# Patient Record
Sex: FEMALE | Race: WHITE | NOT HISPANIC OR LATINO | Employment: FULL TIME | URBAN - NONMETROPOLITAN AREA
[De-identification: names, ages, dates, MRNs, and addresses within clinical notes are randomized per-mention and may not be internally consistent; named-entity substitution may affect disease eponyms.]

---

## 2017-01-10 ENCOUNTER — OFFICE VISIT (OUTPATIENT)
Dept: ORTHOPEDIC SURGERY | Facility: CLINIC | Age: 42
End: 2017-01-10

## 2017-01-10 DIAGNOSIS — S59.911D RIGHT FOREARM INJURY, SUBSEQUENT ENCOUNTER: Primary | ICD-10-CM

## 2017-01-10 PROCEDURE — 99203 OFFICE O/P NEW LOW 30 MIN: CPT | Performed by: ORTHOPAEDIC SURGERY

## 2017-01-10 PROCEDURE — 73090 X-RAY EXAM OF FOREARM: CPT | Performed by: ORTHOPAEDIC SURGERY

## 2017-01-10 RX ORDER — LISINOPRIL 20 MG/1
20 TABLET ORAL DAILY
COMMUNITY

## 2017-01-10 RX ORDER — GABAPENTIN 300 MG/1
300 CAPSULE ORAL 3 TIMES DAILY
COMMUNITY

## 2017-01-10 RX ORDER — QUETIAPINE FUMARATE 50 MG/1
50 TABLET, FILM COATED ORAL NIGHTLY
COMMUNITY

## 2017-01-10 NOTE — LETTER
"2017     BIBI Lanier  [address] [facility] [address]    Patient: Rosalina Hannah   YOB: 1975   Date of Visit: 1/10/2017       Dear BIBI Guerra:    Thank you for referring Rosalina Hannah to me for evaluation. Below are the relevant portions of my assessment and plan of care.    If you have questions, please do not hesitate to call me. I look forward to following Rosalina along with you.         Sincerely,        Joe Massey MD        CC: No Recipients  Joe Massey MD  2017  3:23 PM  Signed  Chief Complaint   Patient presents with   • Right Arm - Establish Care             HPI The patient is here today as a new patient complaining of right arm pain, \"it hurts in her wrist, fingers all the way to the elbow.\" Dictation on: 2017  3:20 PM by: JOE MASSEY [[id number]]               No Known Allergies      Social History     Social History   • Marital status:      Spouse name: N/A   • Number of children: N/A   • Years of education: N/A     Occupational History   • Not on file.     Social History Main Topics   • Smoking status: Not on file   • Smokeless tobacco: Not on file   • Alcohol use Not on file   • Drug use: Not on file   • Sexual activity: Not on file     Other Topics Concern   • Not on file     Social History Narrative   • No narrative on file       History reviewed. No pertinent family history.    Past Surgical History   Procedure Laterality Date   • Cholecystectomy     • Hysterectomy     •  section       x2       History reviewed. No pertinent past medical history.        There were no vitals filed for this visit.          Review of Systems   Constitutional: Negative for activity change, appetite change, fatigue, fever and unexpected weight change.   HENT: Negative for congestion, sneezing and voice change.    Eyes: Negative for visual disturbance.   Respiratory: Negative for cough, chest tightness and wheezing.    "   Cardiovascular: Negative for chest pain, palpitations and leg swelling.   Gastrointestinal: Negative for abdominal distention, constipation, diarrhea and vomiting.   Endocrine: Negative for polydipsia, polyphagia and polyuria.   Genitourinary: Negative for decreased urine volume, difficulty urinating, dysuria, flank pain and frequency.   Musculoskeletal: Positive for joint swelling and myalgias. Negative for arthralgias, back pain, gait problem, neck pain and neck stiffness.   Skin: Negative for color change, pallor and wound.   Allergic/Immunologic: Negative for environmental allergies and food allergies.   Neurological: Positive for weakness and numbness. Negative for dizziness and headaches.   Hematological: Does not bruise/bleed easily.   Psychiatric/Behavioral: Negative for agitation, confusion, decreased concentration and sleep disturbance. The patient is not nervous/anxious.            Physical Exam   Constitutional: She is oriented to person, place, and time. She appears well-developed and well-nourished.   HENT:   Head: Normocephalic.   Eyes: Conjunctivae are normal. Pupils are equal, round, and reactive to light.   Neck: Normal range of motion. Neck supple. No JVD present.   Cardiovascular: Normal rate, normal heart sounds and intact distal pulses.    Pulmonary/Chest: Effort normal and breath sounds normal. No respiratory distress.   Abdominal: Soft. Bowel sounds are normal. There is no tenderness. There is no guarding.   Musculoskeletal: She exhibits tenderness.   Lymphadenopathy:     She has no cervical adenopathy.   Neurological: She is alert and oriented to person, place, and time. She has normal reflexes.   Skin: Skin is warm and dry.   Psychiatric: Her behavior is normal. Judgment and thought content normal.   Vitals reviewed.              Joint/Body Part Specific Exam:   Dictation on: 01/14/2017  3:22 PM by: ALEXANDER MASSEY [[id number]]               X-RAY Report:  right Forearm X-Ray  Indication:  evaluation of fx healing  AP and Lateral views  Findings: fully healed and consolidated ulna fx   no bony lesion  Soft tissues within normal limits  within normal limits joint spaces  Hardware appropriately positioned yes      yes prior studies available for comparison.    X-RAY was ordered and reviewed by Joe Massey MD                    Diagnostics:            Rosalina was seen today for establish care.    Diagnoses and all orders for this visit:    Right forearm injury, subsequent encounter  -     XR Forearm 2 View Right          Procedures          I provided this patient with educational materials regarding bone health and cast or splint care.        Plan:    Dictation on: 01/14/2017  3:23 PM by: JOE MASSEY [[id number]]             CC To BIBI Lnaier
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
## 2017-01-10 NOTE — MR AVS SNAPSHOT
Rosalina Hannah   1/10/2017 2:30 PM   Office Visit    Dept Phone:  [phone]   Encounter #:  [id number]    Provider:  Joe Arias MD   Department:  [facility]                Your Full Care Plan              Your Updated Medication List          This list is accurate as of: 1/10/17  2:58 PM.  Always use your most recent med list.                gabapentin 300 MG capsule   Commonly known as:  NEURONTIN       lisinopril 20 MG tablet   Commonly known as:  PRINIVIL,ZESTRIL       QUEtiapine 50 MG tablet   Commonly known as:  SEROquel               We Performed the Following     XR Forearm 2 View Right       You Were Diagnosed With        Codes Comments    Right forearm injury, subsequent encounter    -  Primary ICD-10-CM: S59.911D  ICD-9-CM: V58.89, 959.3       Instructions     None    Patient Instructions History      Upcoming Appointments     Visit Type Date Time Department    OFFICE VISIT 1/10/2017  2:30 PM [facility]    FOLLOW UP 2017  1:45 PM [facility]      Inspire Signup     [facility] Inspire allows you to send messages to your doctor, view your test results, renew your prescriptions, schedule appointments, and more. To sign up, go to [email or web address] and click on the Sign Up Now link in the New User? box. Enter your Inspire Activation Code exactly as it appears below along with the last four digits of your Social Security Number and your Date of Birth () to complete the sign-up process. If you do not sign up before the expiration date, you must request a new code.    Inspire Activation Code: [email or web address]  Expires: 2017  2:58 PM    If you have questions, you can email [email or web address] or call [phone] to talk to our Inspire staff. Remember, Inspire is NOT to be used for urgent needs. For medical emergencies, dial 911.               Other Info from Your Visit           Your  Appointments     Jul 18, 2017  1:45 PM EDT   Follow Up with Joe Arias MD   [facility] (--)    [address] [place] [address] [address]  [place] [address]   [phone]           Arrive 15 minutes prior to appointment.              Allergies     No Known Allergies      Reason for Visit     Right Arm - Establish Care           Problems and Diagnoses Noted     Injury of right forearm    -  Primary      Results     XR Forearm 2 View Right

## 2017-01-14 PROBLEM — S59.911A RIGHT FOREARM INJURY: Status: ACTIVE | Noted: 2017-01-14

## 2021-07-25 ENCOUNTER — HOSPITAL ENCOUNTER (EMERGENCY)
Dept: HOSPITAL 49 - FER | Age: 46
Discharge: HOME | End: 2021-07-25
Payer: COMMERCIAL

## 2021-07-25 DIAGNOSIS — M17.11: Primary | ICD-10-CM

## 2021-07-25 DIAGNOSIS — Z91.041: ICD-10-CM

## 2021-07-25 DIAGNOSIS — I10: ICD-10-CM

## 2021-12-08 ENCOUNTER — OFFICE VISIT (OUTPATIENT)
Dept: ORTHOPEDIC SURGERY | Facility: CLINIC | Age: 46
End: 2021-12-08

## 2021-12-08 ENCOUNTER — TELEPHONE (OUTPATIENT)
Dept: ORTHOPEDIC SURGERY | Facility: CLINIC | Age: 46
End: 2021-12-08

## 2021-12-08 VITALS — TEMPERATURE: 98 F | HEIGHT: 70 IN | WEIGHT: 293 LBS | BODY MASS INDEX: 41.95 KG/M2

## 2021-12-08 DIAGNOSIS — M17.11 PATELLOFEMORAL ARTHRITIS OF RIGHT KNEE: Primary | ICD-10-CM

## 2021-12-08 PROCEDURE — 20610 DRAIN/INJ JOINT/BURSA W/O US: CPT | Performed by: PHYSICIAN ASSISTANT

## 2021-12-08 PROCEDURE — 99204 OFFICE O/P NEW MOD 45 MIN: CPT | Performed by: PHYSICIAN ASSISTANT

## 2021-12-08 RX ORDER — BUSPIRONE HYDROCHLORIDE 10 MG/1
10 TABLET ORAL 2 TIMES DAILY
COMMUNITY
Start: 2021-11-22

## 2021-12-08 RX ORDER — IBUPROFEN 600 MG/1
600 TABLET ORAL 3 TIMES DAILY
COMMUNITY
Start: 2021-11-15

## 2021-12-08 RX ORDER — CYCLOBENZAPRINE HCL 5 MG
TABLET ORAL
COMMUNITY
Start: 2021-11-15

## 2021-12-08 RX ADMIN — TRIAMCINOLONE ACETONIDE 80 MG: 40 INJECTION, SUSPENSION INTRA-ARTICULAR; INTRAMUSCULAR at 11:40

## 2021-12-08 RX ADMIN — TRIAMCINOLONE ACETONIDE 80 MG: 40 INJECTION, SUSPENSION INTRA-ARTICULAR; INTRAMUSCULAR at 11:04

## 2021-12-08 NOTE — TELEPHONE ENCOUNTER
Caller: ZULEYKA VILCHIS     Relationship: SELF     Best call back number: [phone]    What was the call regarding:  PATIENT IS REQUESTING THE NAME OF PROVIDER THAT MAGALY REFERRED HER TO - PLEASE RETURN CALL     Do you require a callback: YES

## 2021-12-08 NOTE — PROGRESS NOTES
PROCEDURE  Large Joint Arthrocentesis: R knee  Date/Time: 12/8/2021 11:40 AM  Consent given by: patient  Site marked: site marked  Timeout: Immediately prior to procedure a time out was called to verify the correct patient, procedure, equipment, support staff and site/side marked as required   Supporting Documentation  Indications: pain and joint swelling   Procedure Details  Location: knee - R knee  Preparation: Patient was prepped and draped in the usual sterile fashion  Needle size: 25 G  Approach: anteromedial  Medications administered: 80 mg triamcinolone acetonide 40 MG/ML; 2 mL lidocaine (cardiac)  Patient tolerance: patient tolerated the procedure well with no immediate complications           Injection site was identified by physical examination and cleaned with Betadine and alcohol swabs. Prior to needle insertion, ethyl chloride spray was used for surface anesthesia. Sterile technique was used.

## 2021-12-10 ENCOUNTER — TELEPHONE (OUTPATIENT)
Dept: ORTHOPEDIC SURGERY | Facility: CLINIC | Age: 46
End: 2021-12-10

## 2021-12-10 ENCOUNTER — PATIENT ROUNDING (BHMG ONLY) (OUTPATIENT)
Dept: ORTHOPEDIC SURGERY | Facility: CLINIC | Age: 46
End: 2021-12-10

## 2021-12-10 NOTE — TELEPHONE ENCOUNTER
While making my rounding call to the patient she asked about the referral to Dr Sawant.   Were we going to put in a referral and make that appointment for her or is she supposed to make it herself?   She seemed to think we were,but I didn't see a referral in the chart.     bsw

## 2021-12-10 NOTE — PROGRESS NOTES
December 10, 2021    Hello, may I speak with Rosalina Hannah?    My name is Jazmine Espinal        I am  with [facility]  [address] [facility] [address]  [facility] [address] [phone].    Before we get started may I verify your date of birth? 1975    I am calling to officially welcome you to our practice and ask about your recent visit. Is this a good time to talk? yes    Tell me about your visit with us. What things went well?  Everything was great.          We're always looking for ways to make our patients' experiences even better. Do you have recommendations on ways we may improve?  no    Overall were you satisfied with your first visit to our practice? yes       I appreciate you taking the time to speak with me today. Is there anything else I can do for you? Yes.   Patient had questions about her referral to  Dr Sawant.   I put an telephone encounter in for .       Thank you, and have a great day.

## 2021-12-10 NOTE — TELEPHONE ENCOUNTER
The girls here should make it. I have not completed that office visit so I the referral has not been entered. Her chart will be completed shortly.

## 2021-12-14 PROBLEM — M17.11 PATELLOFEMORAL ARTHRITIS OF RIGHT KNEE: Status: ACTIVE | Noted: 2021-12-14

## 2021-12-20 RX ORDER — TRIAMCINOLONE ACETONIDE 40 MG/ML
80 INJECTION, SUSPENSION INTRA-ARTICULAR; INTRAMUSCULAR
Status: COMPLETED | OUTPATIENT
Start: 2021-12-08 | End: 2021-12-08

## 2021-12-20 NOTE — PROGRESS NOTES
"Chief Complaint  Pain of the Right Knee and Establish Care    Subjective    History of Present Illness      Rosalina Hannah is a 46 y.o. female who presents to [facility] for complaint of Knee Pain:   Patient complains of right knee pain.   The pain began 7 months ago.   The pain is located over patellar aspect.    She describes the symptoms as aching, burning, stabbing and throbbing.   Symptoms improve with rest, medication: ibuprofenand meloxicam.   The symptoms are worse with sitting, standing, working, driving, walking.   The knee has given out or felt unstable.   The patient can bend and straighten the knee fully.    She has also done PT from 9/14-10/04 but she had to stop due to pain.   She had an MRI ordered by another orthopedic surgeon.   She has received a steroid injection without relief.       Objective   Vital Signs:   Temp 98 °F (36.7 °C)   Ht 177.8 cm (70\")   Wt (!) 158 kg (349 lb 3.2 oz)   BMI 50.10 kg/m²     Physical Exam  Vitals signs and nursing note reviewed.   Constitutional:       Appearance: Normal appearance.   Pulmonary:      Effort: Pulmonary effort is normal.   Skin:     General: Skin is warm and dry.      Capillary Refill: Capillary refill takes less than 2 seconds.   Neurological:      General: No focal deficit present.      Mental Status: He is alert and oriented to person, place, and time. Mental status is at baseline.   Psychiatric:         Mood and Affect: Mood normal.         Behavior: Behavior normal.         Thought Content: Thought content normal.         Judgment: Judgment normal.     Ortho Exam   RIGHT knee  Positive patellar grind test with pain in the retropatellar region.    Positive for high Q-angle with patella tracking laterally in high grades of flexion.   Skin and soft tissues are swollen, consistent with inflammatory changes of the patellofemoral joint.    Positive for tenderness over the medial patellofemoral ligaments.   Quadriceps " mechanism is well preserved.   Range of motion-Extension to Flexion: 0-120 degrees.  Negative anterior and posterior drawer.   No medial or lateral instability noted.   Dorsalis pedis and posterior tibial artery pulses are palpable.   Common peroneal nerve function is well preserved.      Result Review :   Radiologic studies - see below for interpretation  RIGHT knee xrays nonweightbearing 4 views were performed at [facility] on 7/25/2021. Images were independently viewed and interpreted by myself, my impression as follows:  · Mild to moderate medial compartment changes with osteophyte formation at the medial femoral condyle moderate patellofemoral degenerative changes    Reviewed MRI report of right knee without contrast, performed at  [facility] on 10/29/21, summary of impression below:   · Lateral patellar facet shows severe cartilage loss with minimal subchondral marrow change  · Lateral trochlea shows moderate cartilage loss  · Medial femorotibial compartment shows mild cartilage thinning  · In the popliteal fossa there is a 6.5 cm septated cystic structure, likely ganglion cyst      PROCEDURE  Large Joint Arthrocentesis  Date/Time: 12/8/2021 11:04 AM  Consent given by: patient  Site marked: site marked  Timeout: Immediately prior to procedure a time out was called to verify the correct patient, procedure, equipment, support staff and site/side marked as required   Supporting Documentation  Indications: pain   Procedure Details  Location: knee -   Preparation: Patient was prepped and draped in the usual sterile fashion  Needle size: 25 G  Approach: anteromedial  Medications administered: 2 mL lidocaine (cardiac); 80 mg triamcinolone acetonide 40 MG/ML  Patient tolerance: patient tolerated the procedure well with no immediate complications          Assessment   Assessment and Plan    Problem List Items Addressed This Visit        Musculoskeletal and Injuries    Patellofemoral  arthritis of right knee - Primary    Relevant Orders    Large Joint Arthrocentesis: R knee    Ambulatory Referral to Orthopedic Surgery (Completed)    Large Joint Arthrocentesis          Follow Up   · Discussion of any imaging in detail. Discussion of orthopaedic goals.  · Risk, benefits, and merits of treatment alternatives reviewed with the patient. Treatment alternatives include: intra-articular visco supplementation, intra-articular steroid injection and referral to Dr. Sawant for opinion on possible patellofemoral replacement.  · Ice, heat, and/or modalities as beneficial  · Patient is encouraged to call or return for any issues or concerns.  • Patient was given instructions and counseling regarding her condition or for health maintenance advice. Please see specific information pulled into the AVS if appropriate.     Andrea Carpio PA-C   Date of Encounter: 12/8/2021   Electronically signed by Andrea Carpio PA-C, 12/19/21, 9:10 PM EST.     EMR Dragon/Transcription disclaimer:  Much of this encounter note is an electronic transcription/translation of spoken language to printed text. The electronic translation of spoken language may permit erroneous, or at times, nonsensical words or phrases to be inadvertently transcribed; Although I have reviewed the note for such errors, some may still exist.

## 2023-07-21 ENCOUNTER — TRANSCRIBE ORDERS (OUTPATIENT)
Dept: ADMINISTRATIVE | Facility: HOSPITAL | Age: 48
End: 2023-07-21
Payer: COMMERCIAL

## 2023-07-21 DIAGNOSIS — K42.9 UMBILICAL HERNIA WITHOUT OBSTRUCTION OR GANGRENE: Primary | ICD-10-CM

## 2023-08-02 ENCOUNTER — HOSPITAL ENCOUNTER (OUTPATIENT)
Dept: ULTRASOUND IMAGING | Facility: HOSPITAL | Age: 48
Discharge: HOME | End: 2023-08-02
Admitting: NURSE PRACTITIONER
Payer: COMMERCIAL

## 2023-08-02 DIAGNOSIS — K42.9 UMBILICAL HERNIA WITHOUT OBSTRUCTION OR GANGRENE: ICD-10-CM

## 2023-08-02 PROCEDURE — 76999 ECHO EXAMINATION PROCEDURE: CPT

## 2023-08-10 ENCOUNTER — TRANSCRIBE ORDERS (OUTPATIENT)
Dept: ADMINISTRATIVE | Facility: HOSPITAL | Age: 48
End: 2023-08-10
Payer: COMMERCIAL

## 2023-08-10 DIAGNOSIS — K46.9 HERNIA OF ABDOMINAL CAVITY: Primary | ICD-10-CM

## 2023-08-22 ENCOUNTER — HOSPITAL ENCOUNTER (OUTPATIENT)
Dept: CT IMAGING | Facility: HOSPITAL | Age: 48
Discharge: HOME | End: 2023-08-22
Admitting: NURSE PRACTITIONER
Payer: COMMERCIAL

## 2023-08-22 DIAGNOSIS — K46.9 HERNIA OF ABDOMINAL CAVITY: ICD-10-CM

## 2023-08-22 PROCEDURE — 74176 CT ABD & PELVIS W/O CONTRAST: CPT
